# Patient Record
Sex: MALE | Race: WHITE | NOT HISPANIC OR LATINO
[De-identification: names, ages, dates, MRNs, and addresses within clinical notes are randomized per-mention and may not be internally consistent; named-entity substitution may affect disease eponyms.]

---

## 2022-12-14 ENCOUNTER — TRANSCRIPTION ENCOUNTER (OUTPATIENT)
Age: 62
End: 2022-12-14

## 2022-12-14 ENCOUNTER — APPOINTMENT (OUTPATIENT)
Dept: PAIN MANAGEMENT | Facility: CLINIC | Age: 62
End: 2022-12-14

## 2022-12-14 DIAGNOSIS — Z87.39 PERSONAL HISTORY OF OTHER DISEASES OF THE MUSCULOSKELETAL SYSTEM AND CONNECTIVE TISSUE: ICD-10-CM

## 2022-12-14 PROBLEM — Z00.00 ENCOUNTER FOR PREVENTIVE HEALTH EXAMINATION: Status: ACTIVE | Noted: 2022-12-14

## 2022-12-14 PROCEDURE — 99204 OFFICE O/P NEW MOD 45 MIN: CPT

## 2022-12-14 NOTE — HISTORY OF PRESENT ILLNESS
[8] : 3. What number best describes how, during the past week, pain has interfered with your general activity? 8/10 pain [FreeTextEntry1] : 62 yom presents w/ low back pain that radiates down the bilateral lower extremities. Pain began about two years ago. he had three ALVARO previously all with some relief. Left leg pain began six months ago. Sleeping at night is the worst. He has been in physical therapy for one year with no relief. He also sees a chiropractor with no relief. Almost all of the pain is in his legs, not his back. No relief w/ gabapentin, meloxicam, or cyclobenzaprine. Standing and walking improves his pain.  [FreeTextEntry2] : 24

## 2022-12-14 NOTE — ASSESSMENT
[FreeTextEntry1] : 62 yom presents w/ severe low back and bilateral leg pain.\par \par I have personally reviewed the patient's MRI in detail and discussed it with them which is significant for moderate to severe canal stenosis at L4-L5.\par \par I recommend surgical consultation, he declines.\par \par Recommend ALVARO, he will consider.\par \par Physical therapy prescribed - goal will be to increase ROM, strengthening, postural training, other modalities ad emrcy which may include massage and stim. Goals of therapy discussed with the patient in detail and will be discussed with physical therapist. Patient will follow-up following course of physical therapy to monitor progress and adjust therapy as needed.\par \par Acetaminophen 1,000 mg q8h prn for moderate pain. Risks, benefits, and alternatives of acetaminophen discussed with patient.\par \par Ibuprofen 600 mg q8h prn add when pain is not adequately controlled with acetaminophen. Risks, benefits, and alternatives of ibuprofen discussed with patient.\par \par Diet and nutritional strategies discussed which may improve patients pain and will improve overall health.\par

## 2022-12-14 NOTE — DATA REVIEWED
[FreeTextEntry1] : MRI Lumbar Spine:\par L4-L5: high grade moderate canal stenosis.\par \par \par EMG: \par Acute mild L5 radiculopathy on the right.

## 2022-12-14 NOTE — PHYSICAL EXAM

## 2023-01-20 ENCOUNTER — APPOINTMENT (OUTPATIENT)
Dept: PAIN MANAGEMENT | Facility: CLINIC | Age: 63
End: 2023-01-20
Payer: COMMERCIAL

## 2023-01-20 VITALS
DIASTOLIC BLOOD PRESSURE: 73 MMHG | HEIGHT: 69 IN | SYSTOLIC BLOOD PRESSURE: 123 MMHG | OXYGEN SATURATION: 95 % | HEART RATE: 74 BPM | BODY MASS INDEX: 26.66 KG/M2 | WEIGHT: 180 LBS

## 2023-01-20 PROCEDURE — 99214 OFFICE O/P EST MOD 30 MIN: CPT

## 2023-01-20 RX ORDER — DESMOPRESSIN ACETATE 0.1 MG/1
0.1 TABLET ORAL
Refills: 0 | Status: ACTIVE | COMMUNITY

## 2023-01-20 RX ORDER — GABAPENTIN 300 MG/1
300 CAPSULE ORAL
Refills: 0 | Status: ACTIVE | COMMUNITY

## 2023-01-20 RX ORDER — TELMISARTAN AND AMLODIPINE 40; 10 MG/1; MG/1
40-10 TABLET ORAL
Refills: 0 | Status: ACTIVE | COMMUNITY

## 2023-01-20 RX ORDER — MELOXICAM 7.5 MG/1
7.5 TABLET ORAL
Refills: 0 | Status: ACTIVE | COMMUNITY

## 2023-01-20 NOTE — HISTORY OF PRESENT ILLNESS
[8] : 3. What number best describes how, during the past week, pain has interfered with your general activity? 8/10 pain [FreeTextEntry1] : Interval History:\par Patient returns today for follow-up. His pain has not improved. Pain remains in his low back and down his legs. Also substantial pain in his knees. They hurt after playing pickle ball. He had an EMG of his left side which he wishes to review. Quality of life is impaired. There has been a severe exacerbation of the patient's chronic pain. \par \par HPI: 62 yom presents w/ low back pain that radiates down the bilateral lower extremities. Pain began about two years ago. he had three ALVARO previously all with some relief. Left leg pain began six months ago. Sleeping at night is the worst. He has been in physical therapy for one year with no relief. He also sees a chiropractor with no relief. Almost all of the pain is in his legs, not his back. No relief w/ gabapentin, meloxicam, or cyclobenzaprine. Standing and walking improves his pain.  [FreeTextEntry2] : 24

## 2023-01-20 NOTE — ASSESSMENT
[FreeTextEntry1] : 62 yom presents w/ severe low back and bilateral leg pain.\par \par I have personally reviewed the patient's MRI in detail and discussed it with them which is significant for moderate to severe canal stenosis at L4-L5.\par \par I recommend surgical consultation, he declines.\par \par The patient has failed to have relief with medication management. The patient has failed to have relief with more then six weeks of physical therapy within the last three months. Given the patients failure to improve with all other conservative measures, recommend bilateral L5-S1 transforaminal epidural steroid injection under fluoroscopic guidance. The patient will follow-up with me in my office two weeks following intervention.\par \par I have discussed in detail with the patient that an interventional spine procedure is associated with potential risks. The procedure may include an injection of steroid and potentially other medications (local anesthetic and normal saline) into the epidural space or surrounding tissue of the spine. There are significant risks of this procedure which include and are not limited to infection, bleeding, worsening pain, dural puncture leading to post-dural puncture headache, nerve damage, spinal cord injury, paralysis, stroke, and death. There is a chance that the procedure does not improve their pain. There are risks associated with the steroid being absorbed into the body systemically. These include dysphoria, difficulty sleeping, mood swings, and personality changes. Pre-menopausal women may notice a regularity his in her menstrual cycle for 2-3 months following the injection. Steroids can specifically affect patients with hypertension, diabetes, and peptic ulcers. The procedure may cause a temporary increase in blood pressure and blood glucose, and may adversely affect a peptic ulcer. Other, more rare complications, including avascular necrosis of the joints, glaucoma, and osteoporosis. I have discussed the risks of the procedure at length with the patient, and the potential benefits of pain relief. I have offered alternatives to the procedure. All questions were answered. The patient expressed understanding and wishes to proceed with the procedure.\par \par Recommend bilateral knee viscosupplementation under ultrasound guidance for bilateral knee osteoarthritis.\par Physical therapy prescribed - goal will be to increase ROM, strengthening, postural training, other modalities ad mercy which may include massage and stim. Goals of therapy discussed with the patient in detail and will be discussed with physical therapist. Patient will follow-up following course of physical therapy to monitor progress and adjust therapy as needed.\par \par Acetaminophen 1,000 mg q8h prn for moderate pain. Risks, benefits, and alternatives of acetaminophen discussed with patient.\par \par Ibuprofen 600 mg q8h prn add when pain is not adequately controlled with acetaminophen. Risks, benefits, and alternatives of ibuprofen discussed with patient.\par \par Diet and nutritional strategies discussed which may improve patients pain and will improve overall health.\par

## 2023-01-20 NOTE — DATA REVIEWED
[FreeTextEntry1] : MRI Lumbar Spine:\par L4-L5: high grade moderate canal stenosis.\par \par \par EMG: \par Acute mild L5 radiculopathy on the right.\par \par Mild chronic left S1 radiculopathy.

## 2023-01-20 NOTE — PHYSICAL EXAM

## 2023-01-23 ENCOUNTER — TRANSCRIPTION ENCOUNTER (OUTPATIENT)
Age: 63
End: 2023-01-23

## 2023-02-16 ENCOUNTER — TRANSCRIPTION ENCOUNTER (OUTPATIENT)
Age: 63
End: 2023-02-16

## 2023-02-16 ENCOUNTER — APPOINTMENT (OUTPATIENT)
Dept: PAIN MANAGEMENT | Facility: HOSPITAL | Age: 63
End: 2023-02-16

## 2023-03-03 ENCOUNTER — APPOINTMENT (OUTPATIENT)
Dept: PAIN MANAGEMENT | Facility: CLINIC | Age: 63
End: 2023-03-03
Payer: COMMERCIAL

## 2023-03-03 VITALS
HEIGHT: 69 IN | WEIGHT: 180 LBS | HEART RATE: 80 BPM | OXYGEN SATURATION: 96 % | DIASTOLIC BLOOD PRESSURE: 77 MMHG | BODY MASS INDEX: 26.66 KG/M2 | SYSTOLIC BLOOD PRESSURE: 135 MMHG

## 2023-03-03 DIAGNOSIS — M25.562 PAIN IN RIGHT KNEE: ICD-10-CM

## 2023-03-03 DIAGNOSIS — M25.561 PAIN IN RIGHT KNEE: ICD-10-CM

## 2023-03-03 DIAGNOSIS — M17.0 BILATERAL PRIMARY OSTEOARTHRITIS OF KNEE: ICD-10-CM

## 2023-03-03 PROCEDURE — 99214 OFFICE O/P EST MOD 30 MIN: CPT

## 2023-03-03 NOTE — ASSESSMENT
[FreeTextEntry1] : 62 yom presents w/ severe low back and bilateral leg pain as well as severe knee pain.\par \par I have personally reviewed the patient's MRI in detail and discussed it with them which is significant for moderate to severe canal stenosis at L4-L5.\par \par I recommend surgical consultation, he declines.\par \par The patient has failed to have relief with medication management. The patient has failed to have relief with more then six weeks of physical therapy within the last three months. Given the patients failure to improve with all other conservative measures, recommend bilateral knee intraarticular viscosupplementation under ultrasound guidance. The patient will follow-up with me in my office two weeks following intervention.\par \par We will consider SIJ injections.\par \par Physical therapy prescribed - goal will be to increase ROM, strengthening, postural training, other modalities ad mercy which may include massage and stim. Goals of therapy discussed with the patient in detail and will be discussed with physical therapist. Patient will follow-up following course of physical therapy to monitor progress and adjust therapy as needed.\par \par Acetaminophen 1,000 mg q8h prn for moderate pain. Risks, benefits, and alternatives of acetaminophen discussed with patient.\par \par Ibuprofen 600 mg q8h prn add when pain is not adequately controlled with acetaminophen. Risks, benefits, and alternatives of ibuprofen discussed with patient.\par \par Diet and nutritional strategies discussed which may improve patients pain and will improve overall health.\par

## 2023-03-03 NOTE — PHYSICAL EXAM

## 2023-03-03 NOTE — HISTORY OF PRESENT ILLNESS
[8] : 3. What number best describes how, during the past week, pain has interfered with your general activity? 8/10 pain [FreeTextEntry1] : Interval History:\par Patient returns today for follow-up. His pain has not improved. Pain remains in his low back and down his legs. Also substantial pain in his knees. They hurt after playing pickle ball. He had an EMG of his left side which he wishes to review. Quality of life is impaired. There has been a severe exacerbation of the patient's chronic pain. Minimal relief with ALVARO. He wishes to undergo SIJ injections and knee injections.\par \par HPI: 62 yom presents w/ low back pain that radiates down the bilateral lower extremities. Pain began about two years ago. he had three ALVARO previously all with some relief. Left leg pain began six months ago. Sleeping at night is the worst. He has been in physical therapy for one year with no relief. He also sees a chiropractor with no relief. Almost all of the pain is in his legs, not his back. No relief w/ gabapentin, meloxicam, or cyclobenzaprine. Standing and walking improves his pain.  [FreeTextEntry2] : 24

## 2023-03-16 ENCOUNTER — TRANSCRIPTION ENCOUNTER (OUTPATIENT)
Age: 63
End: 2023-03-16

## 2023-03-16 ENCOUNTER — APPOINTMENT (OUTPATIENT)
Dept: PAIN MANAGEMENT | Facility: HOSPITAL | Age: 63
End: 2023-03-16

## 2023-04-10 ENCOUNTER — APPOINTMENT (OUTPATIENT)
Dept: PAIN MANAGEMENT | Facility: CLINIC | Age: 63
End: 2023-04-10
Payer: COMMERCIAL

## 2023-04-10 VITALS
OXYGEN SATURATION: 97 % | TEMPERATURE: 98 F | WEIGHT: 180 LBS | BODY MASS INDEX: 26.66 KG/M2 | HEART RATE: 74 BPM | DIASTOLIC BLOOD PRESSURE: 80 MMHG | SYSTOLIC BLOOD PRESSURE: 130 MMHG | HEIGHT: 69 IN | RESPIRATION RATE: 16 BRPM

## 2023-04-10 DIAGNOSIS — M53.3 SACROCOCCYGEAL DISORDERS, NOT ELSEWHERE CLASSIFIED: ICD-10-CM

## 2023-04-10 PROCEDURE — 27096 INJECT SACROILIAC JOINT: CPT | Mod: 50

## 2023-04-11 PROBLEM — M53.3 SACROILIAC JOINT DYSFUNCTION OF BOTH SIDES: Status: ACTIVE | Noted: 2023-03-03

## 2023-04-11 NOTE — PROCEDURE
[FreeTextEntry1] : Bilateral Sacroiliac Joint Intraarticular Steroid Injection\par \par The patient was placed in the prone position on the fluoroscopic table with a pillow under the abdomen.  Routine monitors were applied.  The back was draped in the usual sterile fashion and sterile technique was adhered to throughout the entire procedure.\par \par The right sacroiliac joint was identified under fluoroscopic guidance.  Oblique, lateral and AP views were taken to maximize visualization of the joint. The skin and subcutaneous tissues were infiltrated with 1% Lidocaine.  After adequate local anesthesia a 3.5 inch spinal needle was inserted and introduced into the SIJ with fluoro guidance. After negative aspiration for heme the patient was injected with a total of 40 mg methylprednisolone and 2 cc of 1% lidocaine. \par \par Following this the same exact sequence of events was performed on the left sacroiliac joint. Again, After negative aspiration for heme the patient was injected with a total of 40 mg methylprednisolone and 2 cc of 1% lidocaine.\par \par The patient tolerated the procedure well.  There was no neurological deficit post procedure.  The patient went to recovery room in stable condition.  Discharge instructions were given to the patient.\par

## 2023-04-26 ENCOUNTER — APPOINTMENT (OUTPATIENT)
Dept: PAIN MANAGEMENT | Facility: CLINIC | Age: 63
End: 2023-04-26
Payer: COMMERCIAL

## 2023-04-26 PROCEDURE — 99213 OFFICE O/P EST LOW 20 MIN: CPT | Mod: 95

## 2023-04-27 NOTE — HISTORY OF PRESENT ILLNESS
[8] : 3. What number best describes how, during the past week, pain has interfered with your general activity? 8/10 pain [FreeTextEntry1] : Verbal consent was given by/on: Olivier Flannerymichelle on 04/26/23\par \par Patient location: Home\par \par Physician location: Office\par \par Reason for Telehealth visit: Back pain\par \par He reports that he is doing better with PT and his injections. Pain does remain on one side. Quality of life is impaired.\par \par This service took place using a two way audio and visual platform. The patient and Dr. Moy were both able to see each other and communicate through video. There were no barriers to communication. Greater then 50% of the time spent in the encounter involved counseling and coordination of care. \par \par Time spent on visit: 30 minutes\par \par On his previous visit:\par "Patient returns today for follow-up. His pain has not improved. Pain remains in his low back and down his legs. Also substantial pain in his knees. They hurt after playing pickle ball. He had an EMG of his left side which he wishes to review. Quality of life is impaired. There has been a severe exacerbation of the patient's chronic pain. Minimal relief with ALVARO. He wishes to undergo SIJ injections and knee injections."\par \par HPI: 62 yom presents w/ low back pain that radiates down the bilateral lower extremities. Pain began about two years ago. he had three ALVARO previously all with some relief. Left leg pain began six months ago. Sleeping at night is the worst. He has been in physical therapy for one year with no relief. He also sees a chiropractor with no relief. Almost all of the pain is in his legs, not his back. No relief w/ gabapentin, meloxicam, or cyclobenzaprine. Standing and walking improves his pain. \par \par Interventions:\par Bilateral SIJ (04/10/23): [FreeTextEntry2] : 24

## 2023-04-27 NOTE — ASSESSMENT
[FreeTextEntry1] : 62 yom presents w/ severe low back and bilateral leg pain as well as severe knee pain, knees are doing better, PT is helping.\par \par I have personally reviewed the patient's MRI in detail and discussed it with them which is significant for moderate to severe canal stenosis at L4-L5.\par \par I recommend surgical consultation, he declines.\par \par The patient has failed to have relief with medication management. The patient has failed to have relief with more then six weeks of physical therapy within the last three months. Given the patients failure to improve with all other conservative measures, recommend bilateral knee intraarticular viscosupplementation under ultrasound guidance. The patient will follow-up with me in my office two weeks following intervention.\par \par Physical therapy prescribed - goal will be to increase ROM, strengthening, postural training, other modalities ad mercy which may include massage and stim. Goals of therapy discussed with the patient in detail and will be discussed with physical therapist. Patient will follow-up following course of physical therapy to monitor progress and adjust therapy as needed.\par \par Acetaminophen 1,000 mg q8h prn for moderate pain. Risks, benefits, and alternatives of acetaminophen discussed with patient.\par \par Ibuprofen 600 mg q8h prn add when pain is not adequately controlled with acetaminophen. Risks, benefits, and alternatives of ibuprofen discussed with patient.\par \par Diet and nutritional strategies discussed which may improve patients pain and will improve overall health.\par \par I explained to patient benefits and limitation of TeleMedicine visits. Patient understands that limitations include inability to perform comprehensive physical exam, which may lead to potential diagnostic inconsistencies.  Patient understands that diagnosis and treatment may be limited by these inconsistencies and patient agrees to proceed with care plan\par \par

## 2023-04-27 NOTE — PHYSICAL EXAM

## 2023-10-13 ENCOUNTER — APPOINTMENT (OUTPATIENT)
Dept: PAIN MANAGEMENT | Facility: CLINIC | Age: 63
End: 2023-10-13
Payer: COMMERCIAL

## 2023-10-13 VITALS
SYSTOLIC BLOOD PRESSURE: 128 MMHG | OXYGEN SATURATION: 97 % | HEART RATE: 77 BPM | WEIGHT: 180 LBS | BODY MASS INDEX: 26.66 KG/M2 | DIASTOLIC BLOOD PRESSURE: 73 MMHG | HEIGHT: 69 IN

## 2023-10-13 DIAGNOSIS — M79.10 MYALGIA, UNSPECIFIED SITE: ICD-10-CM

## 2023-10-13 DIAGNOSIS — Z78.9 OTHER SPECIFIED HEALTH STATUS: ICD-10-CM

## 2023-10-13 PROCEDURE — 99214 OFFICE O/P EST MOD 30 MIN: CPT

## 2023-10-13 RX ORDER — FLUTICASONE PROPIONATE 50 UG/1
50 SPRAY, METERED NASAL
Refills: 0 | Status: DISCONTINUED | COMMUNITY
End: 2023-10-13

## 2023-11-30 ENCOUNTER — APPOINTMENT (OUTPATIENT)
Dept: PAIN MANAGEMENT | Facility: HOSPITAL | Age: 63
End: 2023-11-30

## 2023-11-30 ENCOUNTER — TRANSCRIPTION ENCOUNTER (OUTPATIENT)
Age: 63
End: 2023-11-30

## 2023-12-13 ENCOUNTER — APPOINTMENT (OUTPATIENT)
Dept: PAIN MANAGEMENT | Facility: CLINIC | Age: 63
End: 2023-12-13
Payer: COMMERCIAL

## 2023-12-13 PROCEDURE — 99214 OFFICE O/P EST MOD 30 MIN: CPT | Mod: 95

## 2023-12-15 NOTE — HISTORY OF PRESENT ILLNESS
[8] : 3. What number best describes how, during the past week, pain has interfered with your general activity? 8/10 pain [FreeTextEntry1] : Verbal consent was given by/on: Olivier Flannerymichelle on 12/13/23  Patient location: Home  Physician location: Office  Reason for Telehealth visit: Back pain  Pt is now s/p ALVARO. His right leg has improved significantly. His left leg is slightly better. Back pain is baseline. Quality of life is impaired. There has been a severe exacerbation of the patient's chronic pain. About 50% relief from ALVARO. No other recent changes in health.  This service took place using a two way audio and visual platform. The patient and Dr. Moy were both able to see each other and communicate through video. There were no barriers to communication. Greater then 50% of the time spent in the encounter involved counseling and coordination of care.   Time spent on visit: 30 minutes    Pt returns for follow-up. About two weeks ago his left leg pain became severe. Right leg is his normal level of pain. He does wake up at night with pain. Gabapentin is helping with his pain. NSAIDS help but he prefers not to take them. Quality of life is impaired. There has been a severe exacerbation of the patient's chronic pain.  On his previous visit: "Patient returns today for follow-up. His pain has not improved. Pain remains in his low back and down his legs. Also substantial pain in his knees. They hurt after playing pickle ball. He had an EMG of his left side which he wishes to review. Quality of life is impaired. There has been a severe exacerbation of the patient's chronic pain. Minimal relief with ALVARO. He wishes to undergo SIJ injections and knee injections."  HPI: 62 yom presents w/ low back pain that radiates down the bilateral lower extremities. Pain began about two years ago. he had three ALVARO previously all with some relief. Left leg pain began six months ago. Sleeping at night is the worst. He has been in physical therapy for one year with no relief. He also sees a chiropractor with no relief. Almost all of the pain is in his legs, not his back. No relief w/ gabapentin, meloxicam, or cyclobenzaprine. Standing and walking improves his pain.   Interventions: Bilateral SIJ (04/10/23): Bilateral L4-L5 TFSI (02/2023): [FreeTextEntry2] : 24

## 2023-12-15 NOTE — PHYSICAL EXAM
[de-identified] : Constitutional: Well-developed, in no acute distress  Psychiatric: Appropriate mood and affect, oriented to time, place, person, and situation

## 2023-12-15 NOTE — ASSESSMENT
[FreeTextEntry1] : 63 yom w/ severe low back and bilateral leg pain.  I have personally reviewed the patient's MRI in detail and discussed it with them which is significant for moderate stenosis at L4-L5.  The patient has failed to have relief with over six weeks of physical therapy within the last three months and all medications. GIven their failure to improve with all other conservative measures recommend MRI lumbar spine. Patient will return to review imaging and plan for potential intervention.  Flexion and extension xrays.  Refer to Dr. Tuttle for surgical consideration.  Gabapentin 300 mg QHS prn.  Physical therapy prescribed - goal will be to increase ROM, strengthening, postural training, other modalities ad mercy which may include massage and stim. Goals of therapy discussed with the patient in detail and will be discussed with physical therapist. Patient will follow-up following course of physical therapy to monitor progress and adjust therapy as needed.  Acetaminophen 1,000 mg q8h prn for moderate pain. Risks, benefits, and alternatives of acetaminophen discussed with patient.  Ibuprofen 600 mg q8h prn add when pain is not adequately controlled with acetaminophen. Risks, benefits, and alternatives of ibuprofen discussed with patient.  Diet and nutritional strategies discussed which may improve patients pain and will improve overall health.  I explained to patient benefits and limitation of TeleMedicine visits. Patient understands that limitations include inability to perform comprehensive physical exam, which may lead to potential diagnostic inconsistencies.  Patient understands that diagnosis and treatment may be limited by these inconsistencies and patient agrees to proceed with care plan

## 2023-12-15 NOTE — DATA REVIEWED
[FreeTextEntry1] : MRI Lumbar Spine: L4-L5: high grade moderate canal stenosis.   EMG:  Acute mild L5 radiculopathy on the right.  Mild chronic left S1 radiculopathy.

## 2024-02-28 ENCOUNTER — APPOINTMENT (OUTPATIENT)
Dept: NEUROSURGERY | Facility: CLINIC | Age: 64
End: 2024-02-28
Payer: COMMERCIAL

## 2024-02-28 VITALS
WEIGHT: 182 LBS | BODY MASS INDEX: 26.96 KG/M2 | SYSTOLIC BLOOD PRESSURE: 138 MMHG | OXYGEN SATURATION: 97 % | DIASTOLIC BLOOD PRESSURE: 82 MMHG | HEIGHT: 69 IN | HEART RATE: 66 BPM

## 2024-02-28 PROCEDURE — 99205 OFFICE O/P NEW HI 60 MIN: CPT

## 2024-02-28 RX ORDER — CHLORHEXIDINE GLUCONATE, 0.12% ORAL RINSE 1.2 MG/ML
0.12 SOLUTION DENTAL
Qty: 473 | Refills: 0 | Status: DISCONTINUED | COMMUNITY
Start: 2023-03-30 | End: 2024-02-28

## 2024-02-28 RX ORDER — GABAPENTIN 300 MG/1
300 CAPSULE ORAL 3 TIMES DAILY
Qty: 90 | Refills: 1 | Status: DISCONTINUED | COMMUNITY
Start: 2023-10-13 | End: 2024-02-28

## 2024-02-28 NOTE — END OF VISIT
[FreeTextEntry3] : I have seen the patient and reviewed the case together with PA and I agree with the final recommendations and plan of care.  Bill Tuttle MD Neurosurgery  [Time Spent: ___ minutes] : I have spent [unfilled] minutes of time on the encounter. [>50% of the face to face encounter time was spent on counseling and/or coordination of care for ___] : Greater than 50% of the face to face encounter time was spent on counseling and/or coordination of care for [unfilled]

## 2024-02-28 NOTE — HISTORY OF PRESENT ILLNESS
[de-identified] : BRI GRAYSON is a 63 year male with a PMH of HTN, OA who presents to the office today for neurosurgical consultation due to low back pain and bilateral lower extremity radiculopathy.  The pain is characterized as severe in nature.  It started about 3 years ago. It is exacerbated by lying down at night or sitting and relieved by standing and walking.  It radiates to his bilateral lower extremities L>R. There has been no known trauma precipitating the pain, but it has been exacerbated by playing pickleball.  The patient denies numbness of extremities, weakness of extremities, bowel or bladder incontinence and gait disturbance.  He has tried multiple ALVARO last done on 11/30/2023 at L4/5 bilaterally, PT for over a year, and pain medications including meloxicam, gabapentin and NSAIDs in the last 3 years without relief.  He has undergone imaging in the form of MRI L spine at [facility] on 2/5/24, which I have independently reviewed today, and which revealed multilevel degenerative change, L1/2 disc herniation with subligamentous craniad migration and moderate central canal stenosis; L4/5 moderately severe central canal stenosis. EMG's were done in Jan 2022 which revealed an L5 radiculopathy (see detailed report below)  Meds: gabapentin, desmopressin, meloxicam, telmisartan/amlodipine Allergies: NKDA Soc Hx: non smoker, occ EtOH

## 2024-02-28 NOTE — PHYSICAL EXAM
[General Appearance - Alert] : alert [General Appearance - In No Acute Distress] : in no acute distress [Sensation Tactile Decrease] : light touch was intact [2+] : Patella left 2+ [Straight-Leg Raise Test - Right] : straight leg raise  of the right leg was negative [Straight-Leg Raise Test - Left] : straight leg raise of the left leg was negative [Antalgic] : antalgic [3+] : Patella left 3+ [Normal] : normal [___] : absent on the left [___] : absent on the right

## 2024-02-28 NOTE — ASSESSMENT
[FreeTextEntry1] : I have discussed the natural history and treatment options for lumbar radiculopathy due to disc herniation with the patient. I explained the indications for observation, conservative management, medical management, physical therapy, pain management approaches and surgery. I explained the different types and surgical approaches including decompression only procedures and instrumented fusions. I discussed the risks, benefits, possible complications and expected outcome related to each treatment option.   In the end, I recommend repeat EMG's of the lower extremities to rule out peripheral neuropathy versus lumbar radiculopathy.  His symptoms are not consistent with neurogenic claudication from lumbar stenosis.  He should return to the office once EMGs are completed for further discussion.    The patient understands the plan of care and is in agreement.  All questions answered to patient satisfaction. Yes

## 2024-06-05 ENCOUNTER — APPOINTMENT (OUTPATIENT)
Dept: NEUROSURGERY | Facility: CLINIC | Age: 64
End: 2024-06-05
Payer: COMMERCIAL

## 2024-06-05 VITALS
HEART RATE: 62 BPM | OXYGEN SATURATION: 97 % | WEIGHT: 182 LBS | BODY MASS INDEX: 26.96 KG/M2 | DIASTOLIC BLOOD PRESSURE: 70 MMHG | SYSTOLIC BLOOD PRESSURE: 128 MMHG | HEIGHT: 69 IN

## 2024-06-05 DIAGNOSIS — M54.16 RADICULOPATHY, LUMBAR REGION: ICD-10-CM

## 2024-06-05 DIAGNOSIS — M47.817 SPONDYLOSIS W/OUT MYELOPATHY OR RADICULOPATHY, LUMBOSACRAL REGION: ICD-10-CM

## 2024-06-05 DIAGNOSIS — M48.061 SPINAL STENOSIS, LUMBAR REGION WITHOUT NEUROGENIC CLAUDICATION: ICD-10-CM

## 2024-06-05 DIAGNOSIS — M43.16 SPONDYLOLISTHESIS, LUMBAR REGION: ICD-10-CM

## 2024-06-05 PROCEDURE — 99215 OFFICE O/P EST HI 40 MIN: CPT

## 2024-06-05 PROCEDURE — 99205 OFFICE O/P NEW HI 60 MIN: CPT

## 2024-06-05 RX ORDER — ROSUVASTATIN CALCIUM 10 MG/1
10 TABLET, FILM COATED ORAL
Refills: 0 | Status: ACTIVE | COMMUNITY

## 2024-06-05 NOTE — PHYSICAL EXAM
[General Appearance - Alert] : alert [General Appearance - In No Acute Distress] : in no acute distress [Sensation Tactile Decrease] : light touch was intact [3+] : Patella left 3+ [Normal] : normal [Antalgic] : antalgic [___] : absent on the right [___] : absent on the left

## 2024-06-05 NOTE — END OF VISIT
[FreeTextEntry3] : I have seen the patient and reviewed the case together with PA and I agree with the final recommendations and plan of care.  Bill Tuttle MD Neurosurgery  [Time Spent: ___ minutes] : I have spent [unfilled] minutes of time on the encounter.

## 2024-06-05 NOTE — HISTORY OF PRESENT ILLNESS
[de-identified] : Mr. Miranda returns to the office today for interval follow up of his lumbar radiculopathy.  He reports that his symptoms of low back pain and bilateral radicular pain have ***persisted since previous visit.  He has undergone repeat EMG's on *** which revealed ***.  2/28/24: BRI MIRANDA is a 63 year male with a PMH of HTN, OA who presents to the office today for neurosurgical consultation due to low back pain and bilateral lower extremity radiculopathy.  The pain is characterized as severe in nature.  It started about 3 years ago. It is exacerbated by lying down at night or sitting and relieved by standing and walking.  It radiates to his bilateral lower extremities L>R. There has been no known trauma precipitating the pain, but it has been exacerbated by playing pickleball.  The patient denies numbness of extremities, weakness of extremities, bowel or bladder incontinence and gait disturbance.  He has tried multiple ALVARO last done on 11/30/2023 at L4/5 bilaterally, PT for over a year, and pain medications including meloxicam, gabapentin and NSAIDs in the last 3 years without relief.  He has undergone imaging in the form of MRI L spine at [facility] on 2/5/24, which I have independently reviewed today, and which revealed multilevel degenerative change, L1/2 disc herniation with subligamentous craniad migration and moderate central canal stenosis; L4/5 moderately severe central canal stenosis. EMG's were done in Jan 2022 which revealed an L5 radiculopathy (see detailed report below)  Meds: gabapentin, desmopressin, meloxicam, telmisartan/amlodipine Allergies: NKDA Soc Hx: non smoker, occ EtOH [FreeTextEntry1] : Mr. Miranda returns to the office today for interval follow up of his lumbar radiculopathy.  He reports that his symptoms of low back pain and bilateral radicular pain L>R and right foot numbness have persisted since previous visit despite 3 years of conservative treatment.  He has undergone repeat EMG's on 5/22/24 which revealed left chronic S1 radiculopathy. Updated MRI L spine at [facility] revealed central canal and L>R severe foraminal stenosis L4/5, L1/2 disc herniation.   2/28/24: BRI MIRANDA is a 63 year male with a PMH of HTN, OA who presents to the office today for neurosurgical consultation due to low back pain and bilateral lower extremity radiculopathy.  The pain is characterized as severe in nature.  It started about 3 years ago. It is exacerbated by lying down at night or sitting and relieved by standing and walking.  It radiates to his bilateral lower extremities L>R. There has been no known trauma precipitating the pain, but it has been exacerbated by playing pickleball.  The patient denies numbness of extremities, weakness of extremities, bowel or bladder incontinence and gait disturbance.  He has tried multiple ALVARO last done on 11/30/2023 at L4/5 bilaterally, PT for over a year, and pain medications including meloxicam, gabapentin and NSAIDs in the last 3 years without relief.  He has undergone imaging in the form of MRI L spine at [facility] on 2/5/24, which I have independently reviewed today, and which revealed multilevel degenerative change, L1/2 disc herniation with subligamentous craniad migration and moderate central canal stenosis; L4/5 moderately severe central canal stenosis. EMG's were done in Jan 2022 which revealed an L5 radiculopathy (see detailed report below)  Meds: gabapentin, desmopressin, meloxicam, telmisartan/amlodipine Allergies: NKDA Soc Hx: non smoker, occ EtOH

## 2024-06-05 NOTE — ASSESSMENT
[FreeTextEntry1] : I have discussed the natural history and treatment options for spinal stenosis with radiculopathy and spondylolisthesis with the patient. I explained the indications for observation, conservative management, medical management, physical therapy, pain management approaches and surgery. I explained the different types and surgical approaches including anterior and posterior approaches as well as decompression only procedures and instrumented fusions. I discussed the risks, benefits, possible complications and expected outcome related to each treatment option. The risks of surgery were discussed in detail including but not limited to postoperative infection at the surgical site, hospital acquired pneumonia, hospital acquired urinary tract infection, postoperative meningitis, wound dehiscence, CSF leak, stroke (ischemic and hemorrhagic), postoperative seizures, worsening motor function due to spinal cord or nerve injury, postoperative visual deficit which could be permanent (blindness) when surgery is performed in a prone position, cardiovascular complications (MI, PE, DVT) and I also explained that some of these complications could lead to sepsis, coma or even death. I discussed the fact that some of these complications may require subsequent surgical procedure(s) to correct them.  In the end my recommendation is for L4/5 bilateral laminectomy, foraminotomy and facetectomy and TLIF.  We will plan for the procedure in late fall per patient request.  He will see him in the office in October for preoperative planning.  He will need CT Lspine prior to the procedure as well as preoperative PST clearance and Cardiac clearance. Patient understood and agreed with our plan of care and decided to move forward with it. All questions were answered.   The patient understands the plan of care and is in agreement.  All questions answered to patient satisfaction.

## 2024-10-07 ENCOUNTER — NON-APPOINTMENT (OUTPATIENT)
Age: 64
End: 2024-10-07

## 2024-10-23 ENCOUNTER — APPOINTMENT (OUTPATIENT)
Dept: NEUROSURGERY | Facility: CLINIC | Age: 64
End: 2024-10-23
Payer: COMMERCIAL

## 2024-10-23 VITALS
OXYGEN SATURATION: 96 % | DIASTOLIC BLOOD PRESSURE: 58 MMHG | RESPIRATION RATE: 16 BRPM | SYSTOLIC BLOOD PRESSURE: 114 MMHG | WEIGHT: 177 LBS | BODY MASS INDEX: 26.14 KG/M2 | HEART RATE: 61 BPM | TEMPERATURE: 97.4 F

## 2024-10-23 DIAGNOSIS — M43.16 SPONDYLOLISTHESIS, LUMBAR REGION: ICD-10-CM

## 2024-10-23 DIAGNOSIS — M48.061 SPINAL STENOSIS, LUMBAR REGION WITHOUT NEUROGENIC CLAUDICATION: ICD-10-CM

## 2024-10-23 PROCEDURE — 99215 OFFICE O/P EST HI 40 MIN: CPT
